# Patient Record
Sex: FEMALE | Race: WHITE | NOT HISPANIC OR LATINO | Employment: UNEMPLOYED | RURAL
[De-identification: names, ages, dates, MRNs, and addresses within clinical notes are randomized per-mention and may not be internally consistent; named-entity substitution may affect disease eponyms.]

---

## 2022-07-29 ENCOUNTER — TELEPHONE (OUTPATIENT)
Dept: FAMILY MEDICINE CLINIC | Facility: CLINIC | Age: 62
End: 2022-07-29

## 2022-07-29 NOTE — TELEPHONE ENCOUNTER
Spoke to pt. We cannot refer without seeing, they usually ask for records.     She actually brought up being seen here before I did. Made her an appt with Jeanie on Wednesday. She has a lot going on. I asked if she would wait until Wednesday because since I work with Jeanie I can get her worked in, we just dont have anything before then. She said she thinks she can. Asked her to please make sure goes to a hospital if she has any bad thoughts, she promised that she would. I told her to call if she needs anything before then and that I would keep an eye out for any openings and call her if anything opens up before then.

## 2022-07-29 NOTE — TELEPHONE ENCOUNTER
Caller: Marylou Alan    Relationship: Self    Best call back number: [phone]    What is the medical concern/diagnosis HAVING A VERY HARD TIME    What specialty or service is being requested:PSYCHIATRY    What is the provider, practice or medical service name: N/A    What is the office location: N/A    What is the office phone number: N/A    Any additional details:

## 2022-08-03 ENCOUNTER — OFFICE VISIT (OUTPATIENT)
Dept: FAMILY MEDICINE CLINIC | Facility: CLINIC | Age: 62
End: 2022-08-03

## 2022-08-03 VITALS
WEIGHT: 160 LBS | DIASTOLIC BLOOD PRESSURE: 76 MMHG | SYSTOLIC BLOOD PRESSURE: 150 MMHG | OXYGEN SATURATION: 97 % | BODY MASS INDEX: 29.44 KG/M2 | HEART RATE: 108 BPM | HEIGHT: 62 IN

## 2022-08-03 DIAGNOSIS — K31.84 GASTROPARESIS: Primary | ICD-10-CM

## 2022-08-03 DIAGNOSIS — F41.1 GENERALIZED ANXIETY DISORDER: ICD-10-CM

## 2022-08-03 DIAGNOSIS — F33.9 DEPRESSION, RECURRENT: ICD-10-CM

## 2022-08-03 PROCEDURE — 99214 OFFICE O/P EST MOD 30 MIN: CPT | Performed by: NURSE PRACTITIONER

## 2022-08-03 RX ORDER — POTASSIUM CHLORIDE 750 MG/1
20 CAPSULE, EXTENDED RELEASE ORAL DAILY
COMMUNITY

## 2022-08-03 RX ORDER — ESCITALOPRAM OXALATE 10 MG/1
10 TABLET ORAL DAILY
Qty: 30 TABLET | Refills: 1 | Status: SHIPPED | OUTPATIENT
Start: 2022-08-03

## 2022-08-03 RX ORDER — LANOLIN ALCOHOL/MO/W.PET/CERES
1000 CREAM (GRAM) TOPICAL DAILY
COMMUNITY

## 2022-08-03 RX ORDER — PROMETHAZINE HYDROCHLORIDE 25 MG/1
25 TABLET ORAL EVERY 6 HOURS
COMMUNITY
Start: 2022-07-07

## 2022-08-03 RX ORDER — ONDANSETRON HYDROCHLORIDE 8 MG/1
TABLET, FILM COATED ORAL
COMMUNITY
Start: 2022-07-07

## 2022-08-03 RX ORDER — PANTOPRAZOLE SODIUM 40 MG/1
40 TABLET, DELAYED RELEASE ORAL DAILY
Qty: 90 TABLET | Refills: 3 | Status: SHIPPED | OUTPATIENT
Start: 2022-08-03

## 2022-08-03 RX ORDER — LORAZEPAM 1 MG/1
1 TABLET ORAL EVERY 6 HOURS PRN
COMMUNITY

## 2022-08-03 RX ORDER — FOLIC ACID 0.8 MG
TABLET ORAL
COMMUNITY
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
## 2022-08-08 ENCOUNTER — TELEPHONE (OUTPATIENT)
Dept: FAMILY MEDICINE CLINIC | Facility: CLINIC | Age: 62
End: 2022-08-08

## 2022-08-08 NOTE — TELEPHONE ENCOUNTER
Caller: Marylou Alan    Relationship to patient: Self    Best call back number: [phone]    Patient is needing: PATIENT STATED THAT SHE HAS BEEN PRESCRIBED:  escitalopram (Lexapro) 10 MG tablet    FOR NAUSEA CONCERNS BUT PATIENT STATES IT IS NOT HELPING AND STILL MAKING HER NAUSEATED    PATIENT WOULD LIKE A PHONE CALL TO DISCUSS FURTHER

## 2022-08-09 NOTE — TELEPHONE ENCOUNTER
It can take 4-6 weeks for the medication to reach it's full, therapeutic level. Hopefully the nausea will improve if she pushes through and keeps taking it. Come back and see me in 1 month for follow up. Thanks!

## 2022-08-18 ENCOUNTER — TELEPHONE (OUTPATIENT)
Dept: FAMILY MEDICINE CLINIC | Facility: CLINIC | Age: 62
End: 2022-08-18

## 2023-01-26 ENCOUNTER — TELEPHONE (OUTPATIENT)
Dept: FAMILY MEDICINE CLINIC | Facility: CLINIC | Age: 63
End: 2023-01-26
Payer: MEDICARE

## 2023-03-01 ENCOUNTER — OFFICE VISIT (OUTPATIENT)
Dept: FAMILY MEDICINE CLINIC | Facility: CLINIC | Age: 63
End: 2023-03-01
Payer: MEDICARE

## 2023-03-01 VITALS
WEIGHT: 167.44 LBS | HEIGHT: 62 IN | OXYGEN SATURATION: 96 % | RESPIRATION RATE: 18 BRPM | SYSTOLIC BLOOD PRESSURE: 118 MMHG | HEART RATE: 103 BPM | BODY MASS INDEX: 30.81 KG/M2 | DIASTOLIC BLOOD PRESSURE: 74 MMHG

## 2023-03-01 DIAGNOSIS — F41.9 ANXIETY: ICD-10-CM

## 2023-03-01 DIAGNOSIS — Z96.653 H/O TOTAL KNEE REPLACEMENT, BILATERAL: ICD-10-CM

## 2023-03-01 DIAGNOSIS — K31.84 GASTROPARESIS: Primary | ICD-10-CM

## 2023-03-01 DIAGNOSIS — Z96.82 GASTRIC NEUROSTIMULATOR DEVICE IN SITU: ICD-10-CM

## 2023-03-01 DIAGNOSIS — Z96.643 HX OF BILATERAL HIP REPLACEMENTS: ICD-10-CM

## 2023-03-01 DIAGNOSIS — I10 PRIMARY HYPERTENSION: ICD-10-CM

## 2023-03-01 PROCEDURE — 99214 OFFICE O/P EST MOD 30 MIN: CPT | Performed by: FAMILY MEDICINE

## 2023-03-01 RX ORDER — LISINOPRIL 10 MG/1
1 TABLET ORAL DAILY
COMMUNITY
Start: 2023-02-23

## 2023-03-01 RX ORDER — EPINEPHRINE 0.3 MG/.3ML
INJECTION SUBCUTANEOUS
COMMUNITY
Start: 2023-02-23

## 2023-03-01 RX ORDER — OMEPRAZOLE 40 MG/1
1 CAPSULE, DELAYED RELEASE ORAL DAILY
COMMUNITY
Start: 2023-02-27

## 2023-03-01 RX ORDER — HYDROXYZINE HYDROCHLORIDE 25 MG/1
25 TABLET, FILM COATED ORAL 3 TIMES DAILY PRN
Qty: 60 TABLET | Refills: 1 | Status: SHIPPED | OUTPATIENT
Start: 2023-03-01

## 2023-03-01 RX ORDER — ERYTHROMYCIN 250 MG/1
TABLET, COATED ORAL
COMMUNITY
Start: 2023-02-27

## 2023-03-01 RX ORDER — OXYCODONE HYDROCHLORIDE 5 MG/1
TABLET ORAL
COMMUNITY
Start: 2023-02-24
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
## 2023-03-02 ENCOUNTER — REFERRAL TRIAGE (OUTPATIENT)
Dept: CASE MANAGEMENT | Facility: OTHER | Age: 63
End: 2023-03-02
Payer: MEDICARE

## 2023-03-06 ENCOUNTER — TELEPHONE (OUTPATIENT)
Dept: CASE MANAGEMENT | Facility: OTHER | Age: 63
End: 2023-03-06
Payer: MEDICARE

## 2023-03-07 ENCOUNTER — TELEPHONE (OUTPATIENT)
Dept: CASE MANAGEMENT | Facility: OTHER | Age: 63
End: 2023-03-07
Payer: MEDICARE

## 2023-03-09 ENCOUNTER — TELEPHONE (OUTPATIENT)
Dept: CASE MANAGEMENT | Facility: OTHER | Age: 63
End: 2023-03-09
Payer: MEDICARE

## 2023-03-17 ENCOUNTER — TELEPHONE (OUTPATIENT)
Dept: CASE MANAGEMENT | Facility: OTHER | Age: 63
End: 2023-03-17
Payer: MEDICARE

## 2023-04-06 ENCOUNTER — TELEPHONE (OUTPATIENT)
Dept: FAMILY MEDICINE CLINIC | Facility: CLINIC | Age: 63
End: 2023-04-06

## 2023-04-06 NOTE — TELEPHONE ENCOUNTER
Caller: Marylou Alan    Relationship to patient: Self    Best call back number: [phone]    Chief complaint: STOMACH ISSUES    Type of visit: OFFICE VISIT     Requested date: ASAP-THIS MONTH     Additional notes: PATIENT STATED IT'S URGENT

## 2023-04-07 NOTE — TELEPHONE ENCOUNTER
HUB TO READ:  Phone numbers on file are not in accepting calls at this time, please update phone number and schedule for a same day.

## 2023-05-05 ENCOUNTER — TELEPHONE (OUTPATIENT)
Dept: FAMILY MEDICINE CLINIC | Facility: CLINIC | Age: 63
End: 2023-05-05
Payer: MEDICARE

## 2023-05-05 ENCOUNTER — OFFICE VISIT (OUTPATIENT)
Dept: FAMILY MEDICINE CLINIC | Facility: CLINIC | Age: 63
End: 2023-05-05
Payer: MEDICARE

## 2023-05-05 VITALS
WEIGHT: 160 LBS | BODY MASS INDEX: 29.44 KG/M2 | DIASTOLIC BLOOD PRESSURE: 74 MMHG | HEART RATE: 122 BPM | HEIGHT: 62 IN | OXYGEN SATURATION: 97 % | SYSTOLIC BLOOD PRESSURE: 102 MMHG

## 2023-05-05 DIAGNOSIS — E55.9 VITAMIN D DEFICIENCY: ICD-10-CM

## 2023-05-05 DIAGNOSIS — R11.0 NAUSEA: Primary | ICD-10-CM

## 2023-05-05 DIAGNOSIS — R53.83 OTHER FATIGUE: ICD-10-CM

## 2023-05-05 DIAGNOSIS — F41.1 GENERALIZED ANXIETY DISORDER: ICD-10-CM

## 2023-05-05 DIAGNOSIS — R73.9 HYPERGLYCEMIA: ICD-10-CM

## 2023-05-05 DIAGNOSIS — E78.2 MIXED HYPERLIPIDEMIA: ICD-10-CM

## 2023-05-05 DIAGNOSIS — E56.9 VITAMIN DEFICIENCY: ICD-10-CM

## 2023-05-05 DIAGNOSIS — K31.84 GASTROPARESIS: ICD-10-CM

## 2023-05-05 DIAGNOSIS — I10 PRIMARY HYPERTENSION: ICD-10-CM

## 2023-05-05 PROCEDURE — 3074F SYST BP LT 130 MM HG: CPT | Performed by: NURSE PRACTITIONER

## 2023-05-05 PROCEDURE — 3078F DIAST BP <80 MM HG: CPT | Performed by: NURSE PRACTITIONER

## 2023-05-05 PROCEDURE — 1160F RVW MEDS BY RX/DR IN RCRD: CPT | Performed by: NURSE PRACTITIONER

## 2023-05-05 PROCEDURE — 99214 OFFICE O/P EST MOD 30 MIN: CPT | Performed by: NURSE PRACTITIONER

## 2023-05-05 PROCEDURE — 1159F MED LIST DOCD IN RCRD: CPT | Performed by: NURSE PRACTITIONER

## 2023-05-05 RX ORDER — ERYTHROMYCIN 250 MG/1
250 TABLET, COATED ORAL 3 TIMES DAILY
COMMUNITY

## 2023-05-05 RX ORDER — PROMETHAZINE HYDROCHLORIDE 6.25 MG/5ML
25 SYRUP ORAL 4 TIMES DAILY PRN
Qty: 240 ML | Refills: 0 | Status: SHIPPED | OUTPATIENT
Start: 2023-05-05

## 2023-05-05 NOTE — PROGRESS NOTES
"Chief Complaint  talk about everything and Nausea    Subjective          Marylou Alan presents to [facility]  History of Present Illness  Pt has ongoing GI problems. She has been diagnosed with gastroparesis and has a stimulator. She needs to establish with a new GI specialist here in [place]. She has not had labs done recently. She has had ongoing anxiety and states Ativan has helped in the past.       Objective   Vital Signs:   /74   Pulse (!) 122   Ht 156.2 cm (61.5\")   Wt 72.6 kg (160 lb)   SpO2 97%   BMI 29.74 kg/m²     Body mass index is 29.74 kg/m².    Review of Systems   Constitutional: Negative for fatigue and fever.   Respiratory: Negative for shortness of breath.    Cardiovascular: Negative for chest pain, palpitations and leg swelling.   Gastrointestinal: Positive for abdominal pain, nausea and vomiting.   Neurological: Negative for syncope.   Psychiatric/Behavioral: The patient is not nervous/anxious.           Current Outpatient Medications:   •  EPINEPHrine (EPIPEN) 0.3 MG/0.3ML solution auto-injector injection, INJECT CONTENTS OF 1 PEN INTRAMUSCULARLY ONCE AS NEEDED FOR ALLERGIC REACTION, Disp: , Rfl:   •  erythromycin base (E-MYCIN) 250 MG tablet, Take 1 tablet by mouth 3 (Three) Times a Day. \"3 weeks on, 1 week off\", Disp: , Rfl:   •  ondansetron (ZOFRAN) 8 MG tablet, TAKE 1 TABLET BY MOUTH 4 TIMES DAILY AS NEEDED, Disp: , Rfl:   •  promethazine (PHENERGAN) 25 MG tablet, Take 1 tablet by mouth Every 6 (Six) Hours., Disp: , Rfl:   •  lisinopril (PRINIVIL,ZESTRIL) 10 MG tablet, Take 1 tablet by mouth Daily., Disp: , Rfl:   •  promethazine (PHENERGAN) 6.25 MG/5ML syrup, Take 20 mL by mouth 4 (Four) Times a Day As Needed for Nausea or Vomiting., Disp: 240 mL, Rfl: 0      Allergies: Reglan [metoclopramide], Acetaminophen-codeine, Droperidol, Ketorolac, Codeine, Prochlorperazine, Tramadol, and Trazodone    Physical Exam  Constitutional:       Appearance: Normal " appearance.   HENT:      Head: Normocephalic.   Eyes:      Conjunctiva/sclera: Conjunctivae normal.      Pupils: Pupils are equal, round, and reactive to light.   Cardiovascular:      Rate and Rhythm: Normal rate and regular rhythm.      Heart sounds: Normal heart sounds.   Pulmonary:      Effort: Pulmonary effort is normal.      Breath sounds: Normal breath sounds.   Abdominal:      Tenderness: There is no abdominal tenderness.   Musculoskeletal:         General: Normal range of motion.   Skin:     General: Skin is warm and dry.      Capillary Refill: Capillary refill takes less than 2 seconds.   Neurological:      General: No focal deficit present.      Mental Status: She is alert and oriented to person, place, and time.   Psychiatric:         Mood and Affect: Mood normal.         Behavior: Behavior normal.         Thought Content: Thought content normal.         Judgment: Judgment normal.          Result Review :                   Assessment and Plan    Diagnoses and all orders for this visit:    1. Nausea (Primary)  Comments:  Liquid Phenergan as requested.  Follow-up with GI.  [place] diet and avoid overeating.  Orders:  -     promethazine (PHENERGAN) 6.25 MG/5ML syrup; Take 20 mL by mouth 4 (Four) Times a Day As Needed for Nausea or Vomiting.  Dispense: 240 mL; Refill: 0  -     Ambulatory Referral to Gastroenterology    2. Gastroparesis  Comments:  Follow-up with GI.  [place] diet and avoid overeating.  Continue current meds.  Orders:  -     Ambulatory Referral to Gastroenterology    3. Mixed hyperlipidemia  -     CBC & Differential; Future  -     Comprehensive Metabolic Panel; Future  -     Lipid Panel; Future    4. Hyperglycemia  -     Hemoglobin A1c; Future    5. Vitamin deficiency  -     Vitamin B12; Future  -     Folate; Future    6. Vitamin D deficiency  -     Vitamin D,25-Hydroxy; Future    7. Other fatigue  -     TSH; Future    8. Primary hypertension  Comments:  Continue current medications.    9.  Generalized anxiety disorder  Comments:  Dr. Hidalgo gave patient hydroxyzine but she did not try this for anxiety.  She can discuss this further with him.                Follow Up   Return in about 1 month (around 6/5/2023) for Recheck.  Patient was given instructions and counseling regarding her condition or for health maintenance advice. Please see specific information pulled into the AVS if appropriate.     Jeanie Hidalgo, MARIA ESTHER

## 2023-05-05 NOTE — TELEPHONE ENCOUNTER
Caller: Marylou Alan    Relationship: Self    Best call back number:    [phone]          What medication are you requesting:   TRIIMACLONE ACETONODINE OINTMENT UST?  What are your current symptoms: CRACKED INFECTED HANDS    How long have you been experiencing symptoms: OFF AND ONE  Have you had these symptoms before:    [x] Yes  [] No    Have you been treated for these symptoms before:   [x] Yes  [] No    If a prescription is needed, what is your preferred pharmacy and phone number: [facility] [address] [place] [address] [phone] [place] [phone]      Additional notes: WAS JUST SEEN AND DISCUSSED WITH THE PROVIDER    IS THERE ANYTHING ELSE SHE CAN GET, THEY GET EXTREMELY INFECTED WITH YELLOW PUS

## 2023-05-31 ENCOUNTER — OFFICE VISIT (OUTPATIENT)
Dept: FAMILY MEDICINE CLINIC | Facility: CLINIC | Age: 63
End: 2023-05-31

## 2023-05-31 VITALS
WEIGHT: 157 LBS | HEIGHT: 62 IN | OXYGEN SATURATION: 96 % | HEART RATE: 116 BPM | BODY MASS INDEX: 28.89 KG/M2 | SYSTOLIC BLOOD PRESSURE: 110 MMHG | DIASTOLIC BLOOD PRESSURE: 80 MMHG

## 2023-05-31 DIAGNOSIS — F41.1 GENERALIZED ANXIETY DISORDER: Primary | ICD-10-CM

## 2023-05-31 DIAGNOSIS — F51.01 PRIMARY INSOMNIA: ICD-10-CM

## 2023-05-31 PROBLEM — Z96.653 H/O TOTAL KNEE REPLACEMENT, BILATERAL: Status: RESOLVED | Noted: 2023-03-01 | Resolved: 2023-05-31

## 2023-05-31 PROBLEM — Z96.643 HX OF BILATERAL HIP REPLACEMENTS: Status: RESOLVED | Noted: 2023-03-01 | Resolved: 2023-05-31

## 2023-05-31 PROCEDURE — 1159F MED LIST DOCD IN RCRD: CPT | Performed by: NURSE PRACTITIONER

## 2023-05-31 PROCEDURE — 1160F RVW MEDS BY RX/DR IN RCRD: CPT | Performed by: NURSE PRACTITIONER

## 2023-05-31 PROCEDURE — 3079F DIAST BP 80-89 MM HG: CPT | Performed by: NURSE PRACTITIONER

## 2023-05-31 PROCEDURE — 3074F SYST BP LT 130 MM HG: CPT | Performed by: NURSE PRACTITIONER

## 2023-05-31 PROCEDURE — 99213 OFFICE O/P EST LOW 20 MIN: CPT | Performed by: NURSE PRACTITIONER

## 2023-05-31 RX ORDER — DOXEPIN HYDROCHLORIDE 25 MG/1
25 CAPSULE ORAL NIGHTLY
Qty: 90 CAPSULE | Refills: 1 | Status: SHIPPED | OUTPATIENT
Start: 2023-05-31

## 2023-05-31 NOTE — LETTER
May 31, 2023    Marylou Alan  [address] [place] [id number]          Ms. Alan is a patient in our office and is under our medical care for anxiety. Her symptoms are controlled.  Thank you.             MARIA ESTHER Barber
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
## 2023-06-02 ENCOUNTER — OFFICE VISIT (OUTPATIENT)
Dept: FAMILY MEDICINE CLINIC | Facility: CLINIC | Age: 63
End: 2023-06-02

## 2023-06-02 VITALS
SYSTOLIC BLOOD PRESSURE: 118 MMHG | WEIGHT: 155 LBS | HEART RATE: 100 BPM | RESPIRATION RATE: 18 BRPM | DIASTOLIC BLOOD PRESSURE: 72 MMHG | HEIGHT: 61 IN | BODY MASS INDEX: 29.27 KG/M2 | TEMPERATURE: 97.2 F

## 2023-06-02 DIAGNOSIS — F41.1 GENERALIZED ANXIETY DISORDER: Primary | ICD-10-CM

## 2023-06-02 PROCEDURE — 1160F RVW MEDS BY RX/DR IN RCRD: CPT | Performed by: NURSE PRACTITIONER

## 2023-06-02 PROCEDURE — 3078F DIAST BP <80 MM HG: CPT | Performed by: NURSE PRACTITIONER

## 2023-06-02 PROCEDURE — 1159F MED LIST DOCD IN RCRD: CPT | Performed by: NURSE PRACTITIONER

## 2023-06-02 PROCEDURE — 3074F SYST BP LT 130 MM HG: CPT | Performed by: NURSE PRACTITIONER

## 2023-06-02 PROCEDURE — 99213 OFFICE O/P EST LOW 20 MIN: CPT | Performed by: NURSE PRACTITIONER

## 2023-06-02 RX ORDER — LOPERAMIDE HYDROCHLORIDE 2 MG/1
CAPSULE ORAL
COMMUNITY
Start: 2023-05-22

## 2023-06-02 RX ORDER — OXYCODONE HYDROCHLORIDE 5 MG/1
TABLET ORAL EVERY 12 HOURS SCHEDULED
COMMUNITY
Start: 2023-05-22

## 2023-06-02 NOTE — LETTER
June 2, 2023    Marylou Alan  [address] [place] [id number]          Ms. Alan is a patient in our office and is under our medical care for anxiety. Her symptoms are controlled. I have not witnessed problems with alcohol or anger. Thank you.               Jeanie Hidalgo, APRN
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
## 2023-10-05 ENCOUNTER — TELEPHONE (OUTPATIENT)
Dept: CASE MANAGEMENT | Facility: OTHER | Age: 63
End: 2023-10-05
Payer: MEDICARE

## 2023-10-05 ENCOUNTER — REFERRAL TRIAGE (OUTPATIENT)
Dept: CASE MANAGEMENT | Facility: CLINIC | Age: 63
End: 2023-10-05
Payer: MEDICARE

## 2023-10-05 ENCOUNTER — OFFICE VISIT (OUTPATIENT)
Dept: FAMILY MEDICINE CLINIC | Facility: CLINIC | Age: 63
End: 2023-10-05
Payer: MEDICARE

## 2023-10-05 VITALS
DIASTOLIC BLOOD PRESSURE: 82 MMHG | WEIGHT: 149 LBS | OXYGEN SATURATION: 100 % | SYSTOLIC BLOOD PRESSURE: 120 MMHG | BODY MASS INDEX: 28.13 KG/M2 | HEART RATE: 114 BPM | HEIGHT: 61 IN

## 2023-10-05 DIAGNOSIS — F41.9 ANXIETY: Primary | ICD-10-CM

## 2023-10-05 DIAGNOSIS — K31.84 GASTROPARESIS: ICD-10-CM

## 2023-10-05 DIAGNOSIS — Z96.82 GASTRIC NEUROSTIMULATOR DEVICE IN SITU: ICD-10-CM

## 2023-10-05 PROCEDURE — 99214 OFFICE O/P EST MOD 30 MIN: CPT | Performed by: FAMILY MEDICINE

## 2023-10-05 PROCEDURE — 1159F MED LIST DOCD IN RCRD: CPT | Performed by: FAMILY MEDICINE

## 2023-10-05 PROCEDURE — 1160F RVW MEDS BY RX/DR IN RCRD: CPT | Performed by: FAMILY MEDICINE

## 2023-10-05 PROCEDURE — 3074F SYST BP LT 130 MM HG: CPT | Performed by: FAMILY MEDICINE

## 2023-10-05 PROCEDURE — 3079F DIAST BP 80-89 MM HG: CPT | Performed by: FAMILY MEDICINE

## 2023-10-05 NOTE — PROGRESS NOTES
"    Office Note     Name: Marylou Alan    : 1960     MRN: [id number]     Chief Complaint  Parkinson's Disease (Issues. Pt here with thearpy dog. )    Subjective     History of Present Illness:  Marylou Alan is a 63 y.o. female who presents today for increased anxiety because she is going to serve 60 days in intermediate.  Her and her  waiting 2 weeks.  She states, \"I will die in intermediate \"  Frannie, a therapy dog, \"will die in intermediate\".  She is got all those records about being diagnosed with Parkinson's disease?  She is got a weight loss stimulator in to combat the gastroparesis    Review of Systems:   Review of Systems    Past Medical History:   Past Medical History:   Diagnosis Date    Injuries     Bone or joint injuries       Past Surgical History:   Past Surgical History:   Procedure Laterality Date    GASTRIC STIMULATOR IMPLANT SURGERY      Gastric Electrical Stimulator    REPLACEMENT TOTAL KNEE Bilateral     TOTAL HIP ARTHROPLASTY Bilateral     TOTAL SHOULDER REPLACEMENT Left        Family History: History reviewed. No pertinent family history.    Social History:   Social History     Socioeconomic History    Marital status:    Tobacco Use    Smoking status: Former     Packs/day: 0.50     Years: 17.00     Pack years: 8.50     Types: Cigarettes     Start date:      Quit date:      Years since quittin.7    Smokeless tobacco: Never   Substance and Sexual Activity    Alcohol use: Yes     Alcohol/week: 2.0 standard drinks     Types: 2 Glasses of wine per week    Sexual activity: Defer       Immunizations:   Immunization History   Administered Date(s) Administered    COVID-19 (MODERNA) 1st,2nd,3rd Dose Monovalent 2021    Pneumococcal Polysaccharide (PPSV23) 2008, 10/01/2009    Shingrix 2023    Tdap 2023        Medications:     Current Outpatient Medications:     doxepin (SINEquan) 25 MG capsule, Take 1 capsule by mouth Every Night., Disp: 90 capsule, Rfl: 1    " "EPINEPHrine (EPIPEN) 0.3 MG/0.3ML solution auto-injector injection, INJECT CONTENTS OF 1 PEN INTRAMUSCULARLY ONCE AS NEEDED FOR ALLERGIC REACTION, Disp: , Rfl:     erythromycin base (E-MYCIN) 250 MG tablet, Take 1 tablet by mouth 3 (Three) Times a Day. \"3 weeks on, 1 week off\", Disp: , Rfl:     lisinopril (PRINIVIL,ZESTRIL) 10 MG tablet, Take 1 tablet by mouth Daily., Disp: , Rfl:     loperamide (IMODIUM) 2 MG capsule, 1 cap(s) orally daily prn for 30 days, Disp: , Rfl:     ondansetron (ZOFRAN) 8 MG tablet, TAKE 1 TABLET BY MOUTH 4 TIMES DAILY AS NEEDED, Disp: , Rfl:     oxyCODONE (ROXICODONE) 5 MG immediate release tablet, Every 12 (Twelve) Hours., Disp: , Rfl:     promethazine (PHENERGAN) 25 MG tablet, Take 1 tablet by mouth Every 6 (Six) Hours., Disp: , Rfl:     promethazine (PHENERGAN) 6.25 MG/5ML syrup, Take 20 mL by mouth 4 (Four) Times a Day As Needed for Nausea or Vomiting., Disp: 240 mL, Rfl: 0    triamcinolone (KENALOG) 0.1 % ointment, Apply 1 application topically to the appropriate area as directed 2 (Two) Times a Day., Disp: 30 g, Rfl: 0    Allergies:   Allergies   Allergen Reactions    Reglan [Metoclopramide] Other (See Comments)     Jaw locks    Acetaminophen-Codeine Itching     Other reaction(s): redness    Droperidol Other (See Comments)     Other reaction(s): Other (Specify with Comments)  Jaw locks  Jaw lock  Adverse reaction: Dystonia  Adverse reaction: Dystonia  Jaw locks  Jaw lock  Other reaction(s): droperidol  Adverse reaction: Dystonia      Ketorolac Other (See Comments)     Other reaction(s): jaw lock    Codeine Other (See Comments)     Other reaction(s): Unknown  Other reaction(s): Unknown      Prochlorperazine Other (See Comments)     Locked jaw    Tramadol Other (See Comments)     Lock jaw      Trazodone Other (See Comments)     Lock jaw         Objective     Vital Signs  /82   Pulse 114   Ht 156.2 cm (61.5\")   Wt 67.6 kg (149 lb) Comment: pt was here with her dog and dog " "weighed with her.  SpO2 100%   BMI 27.70 kg/m²   Estimated body mass index is 27.7 kg/m² as calculated from the following:    Height as of this encounter: 156.2 cm (61.5\").    Weight as of this encounter: 67.6 kg (149 lb).          Physical Exam  Constitutional:       General: She is not in acute distress.     Appearance: Normal appearance. She is obese. She is not toxic-appearing or diaphoretic.   HENT:      Head: Normocephalic and atraumatic.      Right Ear: External ear normal.      Left Ear: External ear normal.      Nose: Nose normal.   Eyes:      Pupils: Pupils are equal, round, and reactive to light.   Skin:     General: Skin is warm and dry.   Neurological:      Mental Status: She is alert.   Psychiatric:         Mood and Affect: Mood normal. Mood is anxious and depressed.         Speech: Speech normal.         Behavior: Behavior normal. Behavior is slowed.    No cogwheeling     Procedures     Assessment and Plan     1. Anxiety  She needed .  I will not write a note for her.   - Ambulatory Referral to Social Care Services (Amb Case Mgmt)    2. Gastroparesis    3. Gastric neurostimulator device in situ     4.  Htn     Follow Up  Return if symptoms worsen or fail to improve.    Trey BRIZUELA [facility]  [address] [address] [phone]  "

## 2023-10-06 NOTE — TELEPHONE ENCOUNTER
PATIENT CALLED AND WOULD LIKE A CALL BACK TO DISCUSS SOME MEDICAL ISSUES GOING ON. PLEASE RETURN HER CALL.

## 2023-10-10 ENCOUNTER — PATIENT OUTREACH (OUTPATIENT)
Dept: CASE MANAGEMENT | Facility: CLINIC | Age: 63
End: 2023-10-10
Payer: MEDICARE

## 2023-10-10 NOTE — OUTREACH NOTE
Social Work Assessment  Questions/Answers      Flowsheet Row Most Recent Value   Referral Source outpatient staff, outpatient clinic, physician   Reason for Consult mental health concerns   Preferred Language English   Advance Care Planning Reviewed no concerns identified   People in Home alone   Current Living Arrangements home   In the past 12 months has the electric, gas, oil, or water company threatened to shut off services in your home? No   Primary Care Provided by self   Provides Primary Care For no one, unable/limited ability to care for self   Family Caregiver if Needed none   Quality of Family Relationships disruptive  [daughter]   Able to Return to Prior Arrangements yes   Employment Status disabled   Source of Income social security, disability   Application for Public Assistance not applied   Usual Activity Tolerance fair   Current Activity Tolerance fair   Medications independent   Meal Preparation independent   Housekeeping independent   Laundry independent   Shopping independent          Patient Outreach    SW received referral via PCP re: mental health concerns. SW called and spoke to patient. Patient experiencing symptoms of Anxiety due to upcoming life circumstances. (Patient reports she is sentenced to 60 days in assisted - court hearing on Thursday 10/19). Patient requests this SW assistance with getting medical clearance for Home Incarceration vs retirement Time. Patient reports she has an  working on the case (Rony Baron). Patient reports she is not able to withstand assisted time due to medical concerns. SW provided education that assisted offers medical services. Per chart review, PCP not agreeable to write a letter for medical exemption. SW provided patient with mental  health providers in [place], [place] who are in network with patient health insurance for any crisis intervention services. Patient denies SI/HI, at this time. Patient provided verbal permission for this SW to speak to  re:  home incarceration. SW attempted to reach  (Rony Baron) Mailbox full. Unable to LVM. SW to continue to encourage patient to work with her own  re: care home time.     Chantale ARREAGA -   Ambulatory Case Management    10/10/2023, 14:29 EDT